# Patient Record
Sex: FEMALE | Race: WHITE | URBAN - METROPOLITAN AREA
[De-identification: names, ages, dates, MRNs, and addresses within clinical notes are randomized per-mention and may not be internally consistent; named-entity substitution may affect disease eponyms.]

---

## 2017-03-24 ENCOUNTER — OFFICE VISIT (OUTPATIENT)
Dept: OBGYN CLINIC | Age: 52
End: 2017-03-24

## 2017-03-24 VITALS
WEIGHT: 93.6 LBS | HEIGHT: 61 IN | DIASTOLIC BLOOD PRESSURE: 62 MMHG | BODY MASS INDEX: 17.67 KG/M2 | SYSTOLIC BLOOD PRESSURE: 100 MMHG

## 2017-03-24 DIAGNOSIS — Z86.19 HISTORY OF HPV INFECTION: Primary | ICD-10-CM

## 2017-03-24 DIAGNOSIS — Z11.51 SPECIAL SCREENING EXAMINATION FOR HUMAN PAPILLOMAVIRUS (HPV): ICD-10-CM

## 2017-03-24 NOTE — PATIENT INSTRUCTIONS
Breast Self-Exam: Care Instructions  Your Care Instructions  A breast self-exam is when you check your breasts for lumps or changes. This regular exam helps you learn how your breasts normally look and feel. Most breast problems or changes are not because of cancer. Breast self-exam is not a substitute for a mammogram. Having regular breast exams by your doctor and regular mammograms improve your chances of finding any problems with your breasts. Some women set a time each month to do a step-by-step breast self-exam. Other women like a less formal system. They might look at their breasts as they brush their teeth, or feel their breasts once in a while in the shower. If you notice a change in your breast, tell your doctor. Follow-up care is a key part of your treatment and safety. Be sure to make and go to all appointments, and call your doctor if you are having problems. Its also a good idea to know your test results and keep a list of the medicines you take. How do you do a breast self-exam?  · The best time to examine your breasts is usually one week after your menstrual period begins. Your breasts should not be tender then. If you do not have periods, you might do your exam on a day of the month that is easy to remember. · To examine your breasts:  ¨ Remove all your clothes above the waist and lie down. When you are lying down, your breast tissue spreads evenly over your chest wall, which makes it easier to feel all your breast tissue. ¨ Use the pads--not the fingertips--of the 3 middle fingers of your left hand to check your right breast. Move your fingers slowly in small coin-sized circles that overlap. ¨ Use three levels of pressure to feel of all your breast tissue. Use light pressure to feel the tissue close to the skin surface. Use medium pressure to feel a little deeper. Use firm pressure to feel your tissue close to your breastbone and ribs.  Use each pressure level to feel your breast tissue before moving on to the next spot. ¨ Check your entire breast, moving up and down as if following a strip from the collarbone to the bra line, and from the armpit to the ribs. Repeat until you have covered the entire breast.  ¨ Repeat this procedure for your left breast, using the pads of the 3 middle fingers of your right hand. · To examine your breasts while in the shower:  ¨ Place one arm over your head and lightly soap your breast on that side. ¨ Using the pads of your fingers, gently move your hand over your breast (in the strip pattern described above), feeling carefully for any lumps or changes. ¨ Repeat for the other breast.  · Have your doctor inspect anything you notice to see if you need further testing. Where can you learn more? Go to http://stephy-xander.info/. Enter P148 in the search box to learn more about \"Breast Self-Exam: Care Instructions. \"  Current as of: July 26, 2016  Content Version: 11.1  © 8966-8362 [employer]. Care instructions adapted under license by [employer] (which disclaims liability or warranty for this information). If you have questions about a medical condition or this instruction, always ask your healthcare professional. [place] [address] any warranty or liability for your use of this information.

## 2017-03-24 NOTE — PROGRESS NOTES
Roselia Garcia is a ,  45 y.o. female [facility] whose LMP was on  who presents for her annual checkup. She is having no significant problems. Menstrual status:    Her periods are absent due to ablation    She denies dysmenorrhea. She reports no premenstrual symptoms. The patient is not using HRT. Contraception:    The current method of family planning is none. Sexual history:    She  reports that she currently engages in sexual activity and has had male partners. She reports using the following method of birth control/protection: None. Medical conditions:    Since her last annual GYN exam about one year ago, she has had the following changes in her health history: none. Pap and Mammogram History:    Her most recent Pap smear was normal, HPV negative, obtained 16    The patient had a recent mammogram 16(Goes to [facility]) which was negative for malignancy. Breast Cancer History/Substance Abuse:    She has no family history of breast cancer. Osteoporosis History:    Family history does not include a first or second degree relative with osteopenia or osteoporosis. A bone density scan was not obtained. Past Medical History:   Diagnosis Date    Abnormal pap 3/2015; 3/2016    2015 LGSIL +HPV;  neg pap +HPV    Burning mouth syndrome     Hypothyroidism 3/2013    Menorrhagia      Past Surgical History:   Procedure Laterality Date    HX GYN  2012    H/S D+C Novasure ablation    HX HEENT  2006    septoplasty     Current Outpatient Prescriptions   Medication Sig Dispense Refill    liothyronine (CYTOMEL) 5 mcg tablet       LEVOXYL 25 mcg tablet        Allergies: Iodine; Phenergan [promethazine]; and Tamiflu [oseltamivir phosphate]   Social History     Social History    Marital status:      Spouse name: N/A    Number of children: N/A    Years of education: N/A     Occupational History    Not on file.      Social History Main Topics    Smoking status: Never Smoker    Smokeless tobacco: Never Used    Alcohol use Yes      Comment: 2 glasses of wine nightly    Drug use: No    Sexual activity: Yes     Partners: Male     Birth control/ protection: None     Other Topics Concern    Not on file     Social History Narrative     Tobacco History:  reports that she has never smoked. She has never used smokeless tobacco.  Alcohol Abuse:  reports that she drinks alcohol. Drug Abuse:  reports that she does not use illicit drugs. There is no problem list on file for this patient.         Review of Systems - History obtained from the patient  Constitutional: negative for weight loss, fever, night sweats  HEENT: negative for hearing loss, earache, congestion, snoring, sorethroat  CV: negative for chest pain, palpitations, edema  Resp: negative for cough, shortness of breath, wheezing  GI: negative for change in bowel habits, abdominal pain, black or bloody stools  : negative for frequency, dysuria, hematuria, vaginal discharge  MSK: negative for back pain, joint pain, muscle pain  Breast: negative for breast lumps, nipple discharge, galactorrhea  Skin :negative for itching, rash, hives  Neuro: negative for dizziness, headache, confusion, weakness  Psych: negative for anxiety, depression, change in mood  Heme/lymph: negative for bleeding, bruising, pallor    Physical Exam    Visit Vitals    /62    Ht 5' 1\" (1.549 m)    Wt 93 lb 9.6 oz (42.5 kg)    BMI 17.69 kg/m2     Constitutional  · Appearance: well-nourished, well developed, alert, in no acute distress    HENT  · Head and Face: appears normal    Neck  · Inspection/Palpation: normal appearance, no masses or tenderness  · Lymph Nodes: no lymphadenopathy present  · Thyroid: gland size normal, nontender, no nodules or masses present on palpation    Chest  · Respiratory Effort: breathing normal  · Auscultation: normal breath sounds    Cardiovascular  · Heart:  · Auscultation: regular rate and rhythm without murmur    Breasts  · Inspection of Breasts: breasts symmetrical, no skin changes, no discharge present, nipple appearance normal, no skin retraction present  · Palpation of Breasts and Axillae: no masses present on palpation, no breast tenderness  · Axillary Lymph Nodes: no lymphadenopathy present    Gastrointestinal  · Abdominal Examination: abdomen non-tender to palpation, normal bowel sounds, no masses present  · Liver and spleen: no hepatomegaly present, spleen not palpable  · Hernias: no hernias identified    Skin  · General Inspection: no rash, no lesions identified    Neurologic/Psychiatric  · Mental Status:  · Orientation: grossly oriented to person, place and time  · Mood and Affect: mood normal, affect appropriate    Genitourinary  · External Genitalia: normal appearance for age, no discharge present, no tenderness present, no inflammatory lesions present, no masses present, no atrophy present  · Vagina: normal vaginal vault without central or paravaginal defects, no discharge present, no inflammatory lesions present, no masses present  · Bladder: non-tender to palpation  · Urethra: appears normal  · Cervix: normal   · Uterus: normal size, shape and consistency  · Adnexa: no adnexal tenderness present, no adnexal masses present  · Perineum: perineum within normal limits, no evidence of trauma, no rashes or skin lesions present  · Anus: anus within normal limits, no hemorrhoids present  · Inguinal Lymph Nodes: no lymphadenopathy present    Assessment:  Routine gynecologic examination  Her current medical status is satisfactory with no evidence of significant gynecologic issues.   Hx of DONNELL I  Plan:  Counseled re: diet, exercise, healthy lifestyle  Return for yearly wellness visits  Rec annual mammogram  Pap/HPV - if abnl needs colpo

## 2017-03-28 LAB
CYTOLOGIST CVX/VAG CYTO: NORMAL
CYTOLOGY CVX/VAG DOC THIN PREP: NORMAL
CYTOLOGY HISTORY:: NORMAL
DX ICD CODE: NORMAL
HPV I/H RISK 1 DNA CVX QL PROBE+SIG AMP: NEGATIVE
Lab: NORMAL
OTHER STN SPEC: NORMAL
PATH REPORT.FINAL DX SPEC: NORMAL
STAT OF ADQ CVX/VAG CYTO-IMP: NORMAL

## 2018-06-14 ENCOUNTER — TELEPHONE (OUTPATIENT)
Dept: OBGYN CLINIC | Age: 53
End: 2018-06-14

## 2018-06-14 NOTE — TELEPHONE ENCOUNTER
Patient calling stating that she had a pap smear done at her PCP office and came back positive for HPV #16 and she wants to discuss this with Dr. Holly Arroyo.     Patient can be reached back at [phone]

## 2018-07-05 ENCOUNTER — TELEPHONE (OUTPATIENT)
Dept: OBGYN CLINIC | Age: 53
End: 2018-07-05

## 2018-07-05 NOTE — TELEPHONE ENCOUNTER
Medical records were never received at dr. Ratna Leavitt office when she went in on Monday. She is calling to check on status.

## 2023-09-11 ENCOUNTER — TRANSCRIBE ORDERS (OUTPATIENT)
Facility: HOSPITAL | Age: 58
End: 2023-09-11

## 2023-09-11 DIAGNOSIS — R07.81 PLEURITIC CHEST PAIN: Primary | ICD-10-CM

## 2024-06-01 ENCOUNTER — OFFICE VISIT (OUTPATIENT)
Age: 59
End: 2024-06-01

## 2024-06-01 VITALS
TEMPERATURE: 97.6 F | WEIGHT: 85 LBS | HEIGHT: 61 IN | RESPIRATION RATE: 16 BRPM | BODY MASS INDEX: 16.05 KG/M2 | DIASTOLIC BLOOD PRESSURE: 80 MMHG | HEART RATE: 75 BPM | SYSTOLIC BLOOD PRESSURE: 125 MMHG

## 2024-06-01 DIAGNOSIS — M54.6 ACUTE LEFT-SIDED THORACIC BACK PAIN: Primary | ICD-10-CM

## 2024-06-01 DIAGNOSIS — R14.0 GASEOUS ABDOMINAL DISTENTION: ICD-10-CM

## 2024-06-01 RX ORDER — THYROID 15 MG/1
15 TABLET ORAL DAILY
COMMUNITY

## 2024-06-01 RX ORDER — SIMETHICONE 80 MG
80 TABLET,CHEWABLE ORAL 4 TIMES DAILY PRN
Qty: 180 TABLET | Refills: 3 | Status: SHIPPED | OUTPATIENT
Start: 2024-06-01

## 2024-06-01 NOTE — PROGRESS NOTES
Machelle Kirkpatrick (:  1965) is a 58 y.o. female,New patient, here for evaluation of the following chief complaint(s):  Abdominal Cramping (IBS-C - 1 day ago \"looked pregnany\" from distention - right lower abdominal and flank pain//Also has back issues)      ASSESSMENT/PLAN:  Visit Diagnoses and Associated Orders       Acute left-sided thoracic back pain    -  Primary    XR LUMBAR SPINE (2-3 VIEWS) [54274 CPT(R)]   - Future Order    XR ABDOMEN (KUB) (SINGLE AP VIEW) [10500 CPT(R)]   - Future Order    diclofenac (VOLTAREN) 50 MG EC tablet [64402]           Gaseous abdominal distention        simethicone (MYLICON) 80 MG chewable tablet [[id number]]           ORDERS WITHOUT AN ASSOCIATED DIAGNOSIS    thyroid (NP THYROID) 15 MG tablet [[id number]]             KUB Findings:  The bowel gas pattern is within normal limits.        There is an air-fluid level in the left lower quadrant. This is a nonspecific   finding.     There is no suspicious calcification identified.     The osseous structures are unremarkable.     IMPRESSION:     1. There is an air-fluid level in the left lower quadrant. This is a nonspecific   finding.  2. No suspicious calcifications identified.     If the patient continues to experience abdominal discomfort or has laboratory   findings suspicious for acute abdomen, consider CT of the abdomen and pelvis for   further evaluation.    XR lumbar spine Findings:  5 non-rib bearing lumbar type vertebral bodies are identified.        There is 7 mm of anterolisthesis of L4 and L5.     There is disc space narrowing at L2-3 and L4-5.     There is no compression fracture present.  There is probable spondylolysis at   L4.     The visualized portions of the abdomen are unremarkable.     IMPRESSION:     There is 7 mm of anterolisthesis of L4 and L5. There is probable spondylolysis   at L4.     There are disc degenerative changes at L2-3 and L4-5.    Follow up in 24-72 hours with [facility]     SUBJECTIVE/OBJECTIVE:    58